# Patient Record
Sex: FEMALE | Race: WHITE | NOT HISPANIC OR LATINO | RURAL
[De-identification: names, ages, dates, MRNs, and addresses within clinical notes are randomized per-mention and may not be internally consistent; named-entity substitution may affect disease eponyms.]

---

## 2023-12-22 DIAGNOSIS — D17.79 LIPOMA OF OTHER SPECIFIED SITES: Primary | ICD-10-CM

## 2024-01-08 ENCOUNTER — OFFICE VISIT (OUTPATIENT)
Dept: SURGERY | Facility: CLINIC | Age: 24
End: 2024-01-08
Attending: SURGERY
Payer: OTHER GOVERNMENT

## 2024-01-08 DIAGNOSIS — D17.9 LIPOMA, UNSPECIFIED SITE: Primary | ICD-10-CM

## 2024-01-08 PROCEDURE — 99205 OFFICE O/P NEW HI 60 MIN: CPT | Mod: S$PBB,,, | Performed by: SURGERY

## 2024-01-08 PROCEDURE — 99203 OFFICE O/P NEW LOW 30 MIN: CPT | Mod: PBBFAC | Performed by: SURGERY

## 2024-01-08 NOTE — PATIENT INSTRUCTIONS
[facility]  [address] [place] [address]      YOUR SURGERY DATE IS 1/15/24.  We will obtain blood work today on 1st floor of the office building.        DO NOT EAT OR DRINK ANYTHING AFTER MIDNIGHT BEFORE YOUR SURGERY    BRING ALL MEDICATIONS YOU ARE CURRENTLY TAKING WITH YOU.      Medication instructions:  You may take blood pressure medication with a small drink of water the morning of surgery.      IF YOU ARE ON ANY OF THESE BLOOD THINNERS, MAKE SURE YOUR PHYSICIAN IS AWARE.  Eliquis/Apixaban            Wafarin/Coumadin,Jantoven  Xarelto/Rivaroxaban      Pletal/Cilostazol  Plavix/Clopidogrel          Pradaxa/Dibigatran    If you are diabetic    Follow the diabetic medicine instructions you received during your pre-operative visit.  DO NOT take your insulin or diabetic medications the morning of surgery.  When you arrive at the surgical center, be sure to tell the nurse you are diabetic.    The following blood sugar medications have to be stopped prior to surgery:    Hold 24 hours prior to surgery:    Libraglutide - Saxenda   Adlyxen - Lixixerutose  Byetta - Exenatide  Saxenda - Liralutide   Victoza    Hold 1 week prior to surgery:    Semaglutide - Ozempic, Wegouy, Rybelsus  Dulaglutide - Trulicity  Tiazepatide - Mounjaro  Bydurcon    Hold 48 hours prior to surgery:    Metformin, Glucovance, Metaglip, Fortamet, Glucophage, Riomet, Avandamet, Glimepiride    YOU MUST PRE-ADMIT AT THE FOLLOWING WEBSITE:  WEBSITE: [email or web address]  PASSWORD: [email or web address]    A STAFF MEMBER FROM THE [facility] WILL CALL YOU THE DAY BEFORE  SURGERY TO LET YOU KNOW WHAT TIME TO ARRIVE THE MORNING OF SURGERY.  IF YOU HAVE NOT HEARD FROM THEM BY 4 P.M. THE DAY BEFORE YOUR SURGERY,   PLEASE CALL THEM -[phone].      IF YOU HAVE ANY QUESTIONS, PLEASE CONTACT OUR OFFICE -[phone].

## 2024-01-09 NOTE — ASSESSMENT & PLAN NOTE
Discussed resection in OR  R/b include infection, bleeding, recurrence, seroma/hematoma, unforeseen.  She understands wishes to proceed.

## 2024-01-09 NOTE — PROGRESS NOTES
Subjective:       Patient ID: Yesenia Serrano is a 23 y.o. female.    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  She reports it has been present for a year or so.  She has noticed enlargement.  The location directly over her spine prevents her from sitting upright/reclining in a chair, as it causes discomfort.  She would like to be considered for resection.         family history is not on file.  No past medical history on file.   No past surgical history on file.         Review of Systems   All other systems reviewed and are negative.         Objective:      There were no vitals taken for this visit.   Physical Exam  Cardiovascular:      Rate and Rhythm: Normal rate.      Pulses: Normal pulses.   Pulmonary:      Effort: Pulmonary effort is normal.      Breath sounds: Normal breath sounds.   Abdominal:      Palpations: Abdomen is soft.   Musculoskeletal:         General: No swelling.   Skin:     Comments: Palpable 5 cm soft tissue mass over the mid-back, in the midline (directly over the spine), nontender, well defined margins   Neurological:      General: No focal deficit present.      Mental Status: She is alert.   Psychiatric:         Mood and Affect: Mood normal.           Assessment/Plan:         Lipoma, unspecified site  -     Ambulatory Referral to External Surgery  -     CBC Auto Differential; Future; Expected date: 01/08/2024         Problem List Items Addressed This Visit          Oncology    Lipoma - Primary    Overview     Involving mid back         Current Assessment & Plan     Discussed resection in OR  R/b include infection, bleeding, recurrence, seroma/hematoma, unforeseen.  She understands wishes to proceed.          Relevant Orders    Ambulatory Referral to External Surgery    CBC Auto Differential (Completed)